# Patient Record
Sex: FEMALE | Race: WHITE | HISPANIC OR LATINO
[De-identification: names, ages, dates, MRNs, and addresses within clinical notes are randomized per-mention and may not be internally consistent; named-entity substitution may affect disease eponyms.]

---

## 2020-08-31 PROBLEM — Z00.00 ENCOUNTER FOR PREVENTIVE HEALTH EXAMINATION: Status: ACTIVE | Noted: 2020-08-31

## 2020-09-16 ENCOUNTER — APPOINTMENT (OUTPATIENT)
Dept: BARIATRICS/WEIGHT MGMT | Facility: CLINIC | Age: 30
End: 2020-09-16
Payer: COMMERCIAL

## 2020-09-16 ENCOUNTER — TRANSCRIPTION ENCOUNTER (OUTPATIENT)
Age: 30
End: 2020-09-16

## 2020-09-16 VITALS — HEIGHT: 62 IN | WEIGHT: 230 LBS | BODY MASS INDEX: 42.33 KG/M2

## 2020-09-16 DIAGNOSIS — F41.9 ANXIETY DISORDER, UNSPECIFIED: ICD-10-CM

## 2020-09-16 DIAGNOSIS — F32.9 ANXIETY DISORDER, UNSPECIFIED: ICD-10-CM

## 2020-09-16 DIAGNOSIS — E66.01 MORBID (SEVERE) OBESITY DUE TO EXCESS CALORIES: ICD-10-CM

## 2020-09-16 PROCEDURE — 99205 OFFICE O/P NEW HI 60 MIN: CPT | Mod: 95

## 2020-09-16 RX ORDER — SERTRALINE HYDROCHLORIDE 100 MG/1
100 TABLET, FILM COATED ORAL DAILY
Refills: 0 | Status: ACTIVE | COMMUNITY
Start: 2020-09-16

## 2020-09-16 RX ORDER — LORAZEPAM 0.5 MG/1
0.5 TABLET ORAL EVERY 6 HOURS
Refills: 0 | Status: ACTIVE | COMMUNITY
Start: 2020-09-16

## 2020-09-17 NOTE — REVIEW OF SYSTEMS
[Negative] : Integumentary [MED-ROS-Cons-FT] : weight gain [MED-ROS-Gastro-FT] : nausea [MED-ROS-Genituro-FT] : loss of bladder control [MED-ROS-Neuro-FT] : headaches [MED-ROS-Psych-FT] : anxiety/depression
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
## 2020-09-17 NOTE — ASSESSMENT
[FreeTextEntry1] : 29 year old with Class 3 obesity, anxiety/depression on zoloft, HTN on no meds - +caffeine+alcohol+smoking, alcohol overuse, chronically elevated liver enzymes, night eating, presents for weight management\par \par - snoring - sleep study ordered\par - HTN - cut down caffeine, cut down alcohol, will take vitals at visit, may need medications if continues to be elevated\par - improve independence in home, meal prep, start breakfast\par - cut down on drinking, think about sobriety.  Needs hobbies, go to bed earlier. \par - +cigarette smoker.  can consider wellbutrin to help stop.  \par - order sleep study\par - f/u TEB - 2 weeks\par - lab work/PE/body comp visit\par \par Extensive dietary counseling was provided:\par -discussed calorie reduction options: plant-based whole food diet vs. Dash / Mediterranean w/ calorie reduction\par -three meal components emphasized: large portion vegetables/fruit, smaller portion protein favoring plant-based, smaller portion high fiber carbohydrates\par -properties of macronutrients were reviewed to help the patient understand why a balanced diet is important\par -discussed the avoidance of red and processed meat, sugar sweetened beverages, refined carbohydrates, high fat dairy\par -advised avoidance of snack products and packaged / processed foods\par -counseled about meal timing and portion: large breakfast, medium lunch, small dinner\par -advised to avoid carbohydrates in evening if possible\par -regular water or seltzer throughout the day\par -for stimulus control, advised to keep unhealthy foods out of the house or out of view\par -recommended abstaining entirely from restaurant / fast food / take-out meals\par \par Lifestyle recommendations for weight loss were extensively reviewed\par -aerobic exercise reviewed: moderate intensity versus high intensity exercise - an estimate of daily / weekly time requirements was reviewed\par -emphasized that resistance training in addition to aerobic may provide added benefit\par -emphasized that long term weight loss and maintenance depend upon exercise\par -the need for adequate sleep (6+ hours) was reviewed\par \par f/u 2 weeks TEB\par \par Bariatric surgery history: none\par Obesity co-morbidities: elevated LFTs, anxiety, HTN\par Comorbidities improved or resolved:na\par Anti-obesity medications:none\par Obesity medication side effects:na

## 2020-10-01 ENCOUNTER — APPOINTMENT (OUTPATIENT)
Dept: BARIATRICS/WEIGHT MGMT | Facility: CLINIC | Age: 30
End: 2020-10-01
Payer: COMMERCIAL

## 2020-10-01 DIAGNOSIS — R06.83 SNORING: ICD-10-CM

## 2020-10-01 DIAGNOSIS — I10 ESSENTIAL (PRIMARY) HYPERTENSION: ICD-10-CM

## 2020-10-01 DIAGNOSIS — R79.89 OTHER SPECIFIED ABNORMAL FINDINGS OF BLOOD CHEMISTRY: ICD-10-CM

## 2020-10-01 DIAGNOSIS — Z72.89 OTHER PROBLEMS RELATED TO LIFESTYLE: ICD-10-CM

## 2020-10-01 DIAGNOSIS — E66.01 MORBID (SEVERE) OBESITY DUE TO EXCESS CALORIES: ICD-10-CM

## 2020-10-01 PROCEDURE — 99214 OFFICE O/P EST MOD 30 MIN: CPT | Mod: 95

## 2020-10-01 NOTE — HISTORY OF PRESENT ILLNESS
[Home] : at home, [unfilled] , at the time of the visit. [Medical Office: ([facility])___] : at the medical office located in  [Verbal consent obtained from patient] : the patient, [unfilled] [FreeTextEntry1] : Ms. ELOISA MCGRATH is a 29 year year old female who presents for evaluation and treatment of Class 3 obesity. \par \par Employment status - full time, human resources. m-f.  Starting new job in 2 weeks, really excited about a new beginning.\par \par Has gained/lost 30-40 pounds over the past year.\par \par Interim - \par She found the plate diagram was helpful, and has been focusing on plant based meals - hasn't been having meat very much anyway.  She is cooking more for herself - for dinner.\par She's meal prepping for 3-4 days.  \par - been eating breakfast more. \par - still struggling with late night snacking/alcohol still but, plans to stop drinking in her room starting tonight. \par - she's been playing video games.  It takes her mind off everything and keeps her hands busy.\par - she might start crotcheting.  \par - take out meals about 4 times last 2 weeks which is less than before.\par - she will get labs/PE/vital visit done and call Dr. cristina for sleep study.\par \par Dietary\par soda 1-2 per day -> diet coke at lunch. \par Dinner- cooking for 3-4 days at a time.  +salad, lentils, beans, mostly\par \par Sleep: 7 hours.  bed - midnight/1am until 8:30am. watching TV, "mindless eating" continues, will decrease starting tonight.\par \par Water intake per day: 6-8 cups.\par \par alcohol 5-6 glasses per day.  She understands she struggles with binge drinking. She would like to stop drinking in her room, will remove all alcohol tonight and will sometimes have glass with dinner\par \par Physical activity:\par 1-2 times a week, 30 minutes - walking, swimming. \par

## 2020-10-01 NOTE — ASSESSMENT
[FreeTextEntry1] : 29 year old with Class 3 obesity, anxiety/depression on zoloft, HTN on no meds - +caffeine+alcohol+smoking, alcohol overuse, chronically elevated liver enzymes, night eating, presents for weight management\par \par - will call to schedule labs/pe/vitals visit.  a little nervous about liver enzymes, discussed will recheck after decreased alcohol intake 3 months time. \par - Will send recipe book. \par - snoring - sleep study ordered will call for appt\par - HTN - continue to cut down caffeine, cut down alcohol, will take vitals at visit, may need medications if continues to be elevated\par - improve independence in home, meal prep, continue breakfast\par - She will stop drinking in her roomy.  Needs hobbies, go to bed earlier. \par - +cigarette smoker.  defers treatment at this time. \par \par - f/u TEB - 4 weeks\par - lab work/PE/body comp visit\par \par f/u 2 weeks TEB\par \par Bariatric surgery history: none\par Obesity co-morbidities: elevated LFTs, anxiety, HTN\par Comorbidities improved or resolved:na\par Anti-obesity medications:none\par Obesity medication side effects:na

## 2020-10-29 ENCOUNTER — APPOINTMENT (OUTPATIENT)
Dept: BARIATRICS/WEIGHT MGMT | Facility: CLINIC | Age: 30
End: 2020-10-29

## 2021-04-18 ENCOUNTER — TRANSCRIPTION ENCOUNTER (OUTPATIENT)
Age: 31
End: 2021-04-18

## 2021-10-06 PROBLEM — I10 ESSENTIAL HYPERTENSION: Status: ACTIVE | Noted: 2020-09-16

## 2021-10-14 ENCOUNTER — TRANSCRIPTION ENCOUNTER (OUTPATIENT)
Age: 31
End: 2021-10-14

## 2022-03-29 ENCOUNTER — TRANSCRIPTION ENCOUNTER (OUTPATIENT)
Age: 32
End: 2022-03-29

## 2022-05-19 ENCOUNTER — NON-APPOINTMENT (OUTPATIENT)
Age: 32
End: 2022-05-19

## 2022-06-03 ENCOUNTER — APPOINTMENT (OUTPATIENT)
Dept: BARIATRICS/WEIGHT MGMT | Facility: CLINIC | Age: 32
End: 2022-06-03

## 2022-07-18 ENCOUNTER — APPOINTMENT (OUTPATIENT)
Dept: BARIATRICS/WEIGHT MGMT | Facility: CLINIC | Age: 32
End: 2022-07-18

## 2022-07-24 ENCOUNTER — NON-APPOINTMENT (OUTPATIENT)
Age: 32
End: 2022-07-24

## 2022-10-07 ENCOUNTER — NON-APPOINTMENT (OUTPATIENT)
Age: 32
End: 2022-10-07

## 2022-10-26 ENCOUNTER — NON-APPOINTMENT (OUTPATIENT)
Age: 32
End: 2022-10-26

## 2023-10-25 ENCOUNTER — NON-APPOINTMENT (OUTPATIENT)
Age: 33
End: 2023-10-25

## 2023-11-04 ENCOUNTER — NON-APPOINTMENT (OUTPATIENT)
Age: 33
End: 2023-11-04